# Patient Record
Sex: FEMALE | Race: ASIAN | NOT HISPANIC OR LATINO | Employment: STUDENT
[De-identification: names, ages, dates, MRNs, and addresses within clinical notes are randomized per-mention and may not be internally consistent; named-entity substitution may affect disease eponyms.]

---
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
## 2018-03-22 ENCOUNTER — OFFICE VISIT (OUTPATIENT)
Dept: URGENT CARE | Facility: URGENT CARE | Age: 18
End: 2018-03-22
Payer: COMMERCIAL

## 2018-03-22 VITALS
BODY MASS INDEX: 20.62 KG/M2 | HEIGHT: 60 IN | DIASTOLIC BLOOD PRESSURE: 51 MMHG | SYSTOLIC BLOOD PRESSURE: 106 MMHG | OXYGEN SATURATION: 100 % | WEIGHT: 105 LBS | HEART RATE: 50 BPM | TEMPERATURE: 97.9 F

## 2018-03-22 DIAGNOSIS — S05.01XA ABRASION OF RIGHT CORNEA, INITIAL ENCOUNTER: Primary | ICD-10-CM

## 2018-03-22 PROCEDURE — 99203 OFFICE O/P NEW LOW 30 MIN: CPT | Performed by: PHYSICIAN ASSISTANT

## 2018-03-22 RX ORDER — MOXIFLOXACIN 5 MG/ML
1 SOLUTION/ DROPS OPHTHALMIC
Qty: 1 BOTTLE | Refills: 0 | Status: SHIPPED | OUTPATIENT
Start: 2018-03-22 | End: 2018-03-29

## 2018-03-22 NOTE — MR AVS SNAPSHOT
"              After Visit Summary   3/22/2018    Joyce Menendez    MRN: [id number]           Patient Information     Date Of Birth          2000        Visit Information        Provider Department      3/22/2018 7:35 PM Shiva Noble PA-C [facility]        Today's Diagnoses     Abrasion of right cornea, initial encounter    -  1       Follow-ups after your visit        Who to contact     If you have questions or need follow up information about today's clinic visit or your schedule please contact [facility] directly at [phone].  Normal or non-critical lab and imaging results will be communicated to you by Alacritechhart, letter or phone within 4 business days after the clinic has received the results. If you do not hear from us within 7 days, please contact the clinic through Alacritechhart or phone. If you have a critical or abnormal lab result, we will notify you by phone as soon as possible.  Submit refill requests through [facility] or call your pharmacy and they will forward the refill request to us. Please allow 3 business days for your refill to be completed.          Additional Information About Your Visit        MyChart Information     [facility] lets you send messages to your doctor, view your test results, renew your prescriptions, schedule appointments and more. To sign up, go to [email or web address] . Click on \"Log in\" on the left side of the screen, which will take you to the Welcome page. Then click on \"Sign up Now\" on the right side of the page.     You will be asked to enter the access code listed below, as well as some personal information. Please follow the directions to create your username and password.     Your access code is: [email or web address]  Expires: 2018  8:24 PM     Your access code will  in 90 days. If you need help or a new code, please call your [facility] clinic or [phone].        Care EveryWhere ID     This is your Care " EveryWhere ID. This could be used by other organizations to access your [facility] medical records  [id number]        Your Vitals Were     Pulse Temperature Height Pulse Oximetry Breastfeeding? BMI (Body Mass Index)    50 97.9  F (36.6  C) (Tympanic) 5' (1.524 m) 100% No 20.51 kg/m2       Blood Pressure from Last 3 Encounters:   03/22/18 106/51    Weight from Last 3 Encounters:   03/22/18 105 lb (47.6 kg)              Today, you had the following     No orders found for display         Today's Medication Changes          These changes are accurate as of 3/22/18  8:24 PM.  If you have any questions, ask your nurse or doctor.               Start taking these medicines.        Dose/Directions    moxifloxacin 0.5 % ophthalmic solution   Commonly known as:  VIGAMOX   Used for:  Abrasion of right cornea, initial encounter   Started by:  Shiva Noble PA-C        Dose:  1 drop   Place 1 drop into the right eye every 4 hours (while awake) for 7 days   Quantity:  1 Bottle   Refills:  0            Where to get your medicines      These medications were sent to [id number] - [place], [place] - [address] AT [facility]  [address], [place] [address]    Hours:  24-hours Phone:  [phone]     moxifloxacin 0.5 % ophthalmic solution                Primary Care Provider Office Phone # Fax #    [facility] [phone] [address]  [facility] [address]        Equal Access to Services     JEFF SUAREZ AH: Hadii aad ku hadasho Soomaali, waaxda luqadaha, qaybta kaalmada adeegyada, phani christine. So [facility] [phone].    ATENCIÓN: Si habla español, tiene a bailey disposición servicios gratuitos de asistencia lingüística. Llame al [phone].    We comply with applicable federal civil rights laws and [place] laws. We do not discriminate on the basis of race, color, national origin, age, disability, sex, sexual  orientation, or gender identity.            Thank you!     Thank you for choosing [facility]  for your care. Our goal is always to provide you with excellent care. Hearing back from our patients is one way we can continue to improve our services. Please take a few minutes to complete the written survey that you may receive in the mail after your visit with us. Thank you!             Your Updated Medication List - Protect others around you: Learn how to safely use, store and throw away your medicines at www.disposemymeds.org.          This list is accurate as of 3/22/18  8:24 PM.  Always use your most recent med list.                   Brand Name Dispense Instructions for use Diagnosis    moxifloxacin 0.5 % ophthalmic solution    VIGAMOX    1 Bottle    Place 1 drop into the right eye every 4 hours (while awake) for 7 days    Abrasion of right cornea, initial encounter

## 2018-03-23 NOTE — NURSING NOTE
Chief Complaint   Patient presents with     Urgent Care     Red Eye     c/o red eye for 4 days       Initial /51  Pulse 50  Temp 97.9  F (36.6  C) (Tympanic)  Ht 5' (1.524 m)  Wt 105 lb (47.6 kg)  SpO2 100%  Breastfeeding? No  BMI 20.51 kg/m2 Estimated body mass index is 20.51 kg/(m^2) as calculated from the following:    Height as of this encounter: 5' (1.524 m).    Weight as of this encounter: 105 lb (47.6 kg).  Medication Reconciliation: complete   Marlene Finn MA

## 2018-03-23 NOTE — PROGRESS NOTES
SUBJECTIVE:  Chief Complaint:   Chief Complaint   Patient presents with     Urgent Care     Red Eye     c/o red eye for 4 days     History of Present Illness:  Joyce Menendez is a 18 year old female who presents complaining of moderate right eye redness for 3 day(s). Left contacts in for 2 days until today. Did get sunbed tanning x2 prior to symptoms with use of eye covering.   Onset/timing: sudden.    Associated Signs and Symptoms: none  Treatment measures tried include: none  Contact wearer : Yes     No past medical history on file.  No current outpatient prescriptions on file.        ROS:  CONSTITUTIONAL:NEGATIVE for fever, chills, change in weight  EYES: eye pain right and redness right    OBJECTIVE:  /51  Pulse 50  Temp 97.9  F (36.6  C) (Tympanic)  Ht 5' (1.524 m)  Wt 105 lb (47.6 kg)  SpO2 100%  Breastfeeding? No  BMI 20.51 kg/m2  General: no acute distress  Eye exam: left eye normal lid, conjunctiva, cornea, pupil and fundus, right eye abnormal findings: conjunctivitis with erythema, corneal abrasion noted 5 o'clock 1 mm.   .  Ears: normal canals, TMs bilaterally, normal TM mobility  Nose: NORMAL - no drainage, turbinates normal in size.  Neck: supple, non-tender, free range of motion, no adenopathy    ASSESSMENT:      1. Abrasion of right cornea, initial encounter    - moxifloxacin (VIGAMOX) 0.5 % ophthalmic solution; Place 1 drop into the right eye every 4 hours (while awake) for 7 days  Dispense: 1 Bottle; Refill: 0    PLAN:  Antibiotic ointment per order. Follow up with eye clinic if not improving over the next 48 hrs.   See orders in epic

## 2018-12-09 ENCOUNTER — OFFICE VISIT (OUTPATIENT)
Dept: URGENT CARE | Facility: URGENT CARE | Age: 18
End: 2018-12-09
Payer: COMMERCIAL

## 2018-12-09 VITALS
TEMPERATURE: 97.9 F | SYSTOLIC BLOOD PRESSURE: 103 MMHG | HEART RATE: 81 BPM | BODY MASS INDEX: 20.62 KG/M2 | WEIGHT: 105 LBS | HEIGHT: 60 IN | DIASTOLIC BLOOD PRESSURE: 64 MMHG | OXYGEN SATURATION: 99 %

## 2018-12-09 DIAGNOSIS — S09.90XA HEAD INJURY, INITIAL ENCOUNTER: Primary | ICD-10-CM

## 2018-12-09 PROCEDURE — 99213 OFFICE O/P EST LOW 20 MIN: CPT | Performed by: FAMILY MEDICINE

## 2018-12-09 ASSESSMENT — MIFFLIN-ST. JEOR: SCORE: 1167.78

## 2018-12-10 NOTE — PROGRESS NOTES
Subjective: About an hour ago patient was working out and tried to jump onto what looks like a cart, and it pushed away and she fell straight back hitting her upper back and neck and head onto the floor.  She was not knocked out but she was a little dazed and she is dizzy.  In high school she had a concussion that took almost 6 months to recover from, feels the symptoms are somewhat similar which really worries her.  She does not feel nauseous.    Objective: Neurologic exam is pretty normal except that with her eyes shut it is hard for her to touch her nose, but she can do it.  She does not have a tremor.  Fundi are normal.  TMs are normal.  Nose is normal.  DTRs are normal and symmetric.  Muscle strength is normal and symmetric.  Neck is normal.    Assessment and plan: Head injury from a fall.  I think at this point it is too early to tell what will really happen.  She should probably follow-up with the concussion clinic.  She has finals next week in school, works part-time as a , uncertain how much she will be able to do in the coming weeks.

## 2018-12-19 ENCOUNTER — TELEPHONE (OUTPATIENT)
Dept: SURGERY | Facility: CLINIC | Age: 18
End: 2018-12-19

## 2018-12-20 ENCOUNTER — RECORDS - HEALTHEAST (OUTPATIENT)
Dept: ADMINISTRATIVE | Facility: OTHER | Age: 18
End: 2018-12-20

## 2018-12-20 ENCOUNTER — TELEPHONE (OUTPATIENT)
Dept: SURGERY | Facility: CLINIC | Age: 18
End: 2018-12-20

## 2018-12-20 ENCOUNTER — OFFICE VISIT (OUTPATIENT)
Dept: SURGERY | Facility: CLINIC | Age: 18
End: 2018-12-20
Payer: COMMERCIAL

## 2018-12-20 VITALS
HEIGHT: 60 IN | WEIGHT: 105 LBS | BODY MASS INDEX: 20.62 KG/M2 | SYSTOLIC BLOOD PRESSURE: 104 MMHG | HEART RATE: 78 BPM | DIASTOLIC BLOOD PRESSURE: 63 MMHG | OXYGEN SATURATION: 99 %

## 2018-12-20 DIAGNOSIS — G44.201 ACUTE INTRACTABLE TENSION-TYPE HEADACHE: ICD-10-CM

## 2018-12-20 DIAGNOSIS — S06.0X0A CONCUSSION WITHOUT LOSS OF CONSCIOUSNESS, INITIAL ENCOUNTER: Primary | ICD-10-CM

## 2018-12-20 DIAGNOSIS — M54.2 CERVICALGIA: ICD-10-CM

## 2018-12-20 DIAGNOSIS — F43.23 ADJUSTMENT DISORDER WITH MIXED ANXIETY AND DEPRESSED MOOD: ICD-10-CM

## 2018-12-20 DIAGNOSIS — R42 DIZZINESS: ICD-10-CM

## 2018-12-20 DIAGNOSIS — H53.149 VISUAL DISCOMFORT, UNSPECIFIED LATERALITY: ICD-10-CM

## 2018-12-20 RX ORDER — ACETAMINOPHEN 325 MG/1
325-650 TABLET ORAL EVERY 4 HOURS PRN
COMMUNITY

## 2018-12-20 SDOH — HEALTH STABILITY: MENTAL HEALTH: HOW OFTEN DO YOU HAVE A DRINK CONTAINING ALCOHOL?: NEVER

## 2018-12-20 ASSESSMENT — ANXIETY QUESTIONNAIRES
GAD7 TOTAL SCORE: 14
4. TROUBLE RELAXING: MORE THAN HALF THE DAYS
6. BECOMING EASILY ANNOYED OR IRRITABLE: SEVERAL DAYS
GAD7 TOTAL SCORE: 14
7. FEELING AFRAID AS IF SOMETHING AWFUL MIGHT HAPPEN: SEVERAL DAYS
5. BEING SO RESTLESS THAT IT IS HARD TO SIT STILL: MORE THAN HALF THE DAYS
7. FEELING AFRAID AS IF SOMETHING AWFUL MIGHT HAPPEN: SEVERAL DAYS
2. NOT BEING ABLE TO STOP OR CONTROL WORRYING: NEARLY EVERY DAY
GAD7 TOTAL SCORE: 14
3. WORRYING TOO MUCH ABOUT DIFFERENT THINGS: NEARLY EVERY DAY
1. FEELING NERVOUS, ANXIOUS, OR ON EDGE: MORE THAN HALF THE DAYS

## 2018-12-20 ASSESSMENT — PATIENT HEALTH QUESTIONNAIRE - PHQ9
SUM OF ALL RESPONSES TO PHQ QUESTIONS 1-9: 14
10. IF YOU CHECKED OFF ANY PROBLEMS, HOW DIFFICULT HAVE THESE PROBLEMS MADE IT FOR YOU TO DO YOUR WORK, TAKE CARE OF THINGS AT HOME, OR GET ALONG WITH OTHER PEOPLE: VERY DIFFICULT
SUM OF ALL RESPONSES TO PHQ QUESTIONS 1-9: 14

## 2018-12-20 ASSESSMENT — PAIN SCALES - GENERAL: PAINLEVEL: MODERATE PAIN (4)

## 2018-12-20 ASSESSMENT — MIFFLIN-ST. JEOR: SCORE: 1167.78

## 2018-12-20 NOTE — LETTER
December 20, 2018    Workability Form for Joyce Menendez, 2000 who is under my care for a concussion that occurred on 12/9/2018     Date of most recent exam: December 20 th. 2018  Date of next exam: week of January 28th    Diagnosis:  *** Concussion with loss of consciousness, 30 minutes or less  S06.0X1A  *** Concussion without loss of consciousness  S06.0X0D  *** Post concussion syndrome   F07.81    --- is not permitted to work until medically cleared. ***   --- Will need to be off work and may start again on ***  --- May return to work as of *** with the following restrictions:  1. Work- {ccwork:586975}, ***on non-consecutive days.  2. Must have a 10 minute break every 2 hours.  3. Driving- {ccdrive:844638}  4. No heavy lifting more than *** pounds  5. No working with machinery  6. No heights due to risk of dizziness, balance problems    The following accommodations may help in reducing the cognitive load, thereby minimizing post-concussion symptoms.  As the employer, it is encouraged to discuss and establish accommodations based on individual work responsibilities.  If symptoms persist, more formal accommodations may be necessary.   1)  Allow more time for, or delay for projects.   2)  Allow more time for work completion.   3)  Allow for reduced work load.   4)  Allow for less multi-task work.  Single tasks until completion will improve productivity.   5)  Allow breaks as needed to control symptom levels. For example, if symptoms worsen during a specific task, project or meeting, She may need to leave that area temporarily or rest in a quiet area until symptoms improve.   6)  Provide a quiet area for lunch.   7)  Allow use of sunglasses during the day.     Full or partial days missed due to post-concussion symptoms and follow up appointments should be medically excused.    Follow up evaluation and revision of recommendations to occur on week of January 28th.    Please feel free to contact me at the  number below with any questions or concerns.    Sincerely,            Alton Turner PA-C  Concussion Clinic  [facility]  Clinic nurse line: [phone]  Fax: [phone]

## 2018-12-20 NOTE — PROGRESS NOTES
[facility] Concussion Clinic Evaluation  December 20, 2018        Assessment:   (S06.0X0A) Concussion without loss of consciousness, initial encounter  (primary encounter diagnosis)  (G44.201) Acute intractable tension-type headache  (M54.2) Cervicalgia  (H53.149) Visual discomfort, unspecified laterality  (R42) Dizziness  (F43.23) Adjustment disorder with mixed anxiety and depressed mood    Joyce Menendez is a 18 year old female who presents for evaluation of concussion without loss of consciousness which occurred on 12/9/2018.  She explains that she was doing box jumps at her gym with mats that were not velcroed well.  1 of the boxes gave way and she ended up falling and hitting her upper back, neck and head on the floor.  She notes immediately feeling dazed and dizzy.  She presented to urgent care directly from the gym for evaluation.  Exam was unremarkable and she denied nausea at the time she was referred here for further follow-up.  She subsequently presented to [facility] and was diagnosed with concussion, advised to stay off screens and get in contact with the disability resource center.    Currently, she notes an intractable pressure/tension headache that waxes and wanes in severity and does respond well to Tylenol.  Is located at the top of her head and right radiates posteriorly.  She lost her balance recently but did not fall and notes that there are a lot of little symptoms that she notices throughout the day that are frustrating.  She is dizzy when standing up too fast and notes halos around lights.  She is having particular trouble with prolonged screen use which is problematic for her as a student facing final exams.  She is taken incompletes in all 3 of her courses.  She also notes an exertional headache, particularly pounding when ascending stairs.  She also become dizzy when bending to pick something up.  She has difficulty falling and staying asleep, more so than usual.   "Racing thoughts impaired falling asleep.  CSA score is 55/90.    Joyce sustained a concussion with loss of consciousness while she was in high school.  The injury was first noted on 12/3/2016 and she was diagnosed with postconcussion syndrome on 4/8/2017.  She was seen by the [facility] pediatric neuropsychology clinic for evaluation on 10/27/2017 and diagnosed with anxiety disorder and history of concussion.  She was not other wise formally treated for her concussion.  She tends toward anxiety and has tried an SSRI during her freshman year of college but did not tolerate this well-noting that on Zoloft she \"did not feel like herself\".  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  Personally, she is facing a difficult home life.  About a month ago she moved out of her mother's house due to her alcohol abuse, living with her godmother for about 1 month.  Just today she is moving back in with her mother and is stressed about this.    On exam, so if he is anxious appearing but otherwise in no distress.  She is tender paracervical musculature bilaterally, palpation here reproduces her headache.  Convergence testing is with a 10 cm vergence point, approaching this provokes symptoms of dizziness and headache.  On Romberg testing she is able to maintain her station but has some excess sway.  She deviates from straight line during gait testing with her head tilted to the side.  She has some inefficiencies on cognitive screening but otherwise performs well.  Strength and coordination appear normal.  BHAVANA-7 and PHQ-9 scores are both 14 indicating moderate anxiety and depression symptoms.    Overall, my impression is that self he did sustain a concussion as a result of her injury on 12/9/2018 however I am concerned that she is at an increased risk for an adjustment disorder and resulting prolonged course of " symptoms given her experience with prolonged concussion symptoms in this her recent history, premorbid anxiety, the demands of full-time college work, and her difficult personal situation at home.  This was explained to her.  We spent time discussing the pathophysiology of concussion as well as the typical timeframe to resolution of  the biochemical changes thereof, which is about 3-6 weeks, occasionally up to 3 months.  I emphasized to her that this episode does not need to be a repeat of her greater than 1 year course of symptoms last time.  She should gradually return back to all of her usual activities, taking breaks as needed to help manage her symptom levels.  We discussed perhaps taking a 10-minute break for every 50 minutes of activity.  We discussed ways that she can compensate for her vestibular dysfunction, particularly those impairing her ability to look at screens including modification of her screen settings and closing her eyes during scrolling.  I also would like her to endeavor for daily light, low impact cardiovascular activity from the standpoint of increased cranial blood flow, anti-inflammatory effects, as well as benefits to mood and cognition.  I provided a letter for her to share with your professors outlining certain academic accommodations that may be helpful to her.  She would benefit from occupational therapy to help her with symptom compensation strategies as well as physical therapy to help address her dizziness, headache, and neck pain.  Finally, given her risk for prolonged course of symptoms I am referring her for a brief neuropsychology evaluation.  Will plan to follow up in 6 weeks to assess her progress and determine if further intervention is necessary.      Plan:  1. Biggest concern of pt addressed: School    2. Activity recommendations- Light, low impact aerobic activity    3. Medications:  a. None    4. Referral to:   a. Physical Therapy:   1. Indications/Goals: evaluation and  "treatment including but not limited to dizziness, HA  b. Occupational Therapy:   1. Indications/Goals: evaluation and treatment including but not limited to visual discomfort, cognitive compensation  c. Neuropsychology:   1. Provider: [place], Brief Eval  2. Indications/Goals: evaluation including further characterization of cognitive and mood deficits, assistance with care planning moving forward, brief intervention vs risks for prolonged recovery.    5. Follow up here in 6 weeks.    6. Letters written today for:  school    AVS Instructions:  ~ You WILL get better~    GENERAL ADVICE  ~ Gradually ease back into your usual activities.  ~ Rest as needed to help your symptoms go away.   - Consider pairing 50 minutes of activity with 10 minutes of rest.  ~ Allow yourself more time for activities.  ~ Write things down.  At home, at work, whenever there is something that you should remember, even it is simple.    SCREENS  ~ Change settings on your phone and computer using the \"Blue Light Filter\" (Night Shift on all Apple products)   ~ The goal is making screens more yellow and less blue.     ~ If this is not an option you can download this program: Cass Art, to adjust your screen resolution.  ~ Turn screen brightness down  ~ Increase font size    HEADACHE  ~ Take tylenol (1000 mg) three times a day as needed  ~ Take ibuprofen (600 mg) three times a day as needed (take with food)    NECK PAIN  ~ Ice or Heat are good~  ~ Massage is ok if it doesn't trigger more symptoms~  ~ Gentle stretches and range-of-motion are helpful    FATIGUE  ~ Daily exercise is strongly encouraged.  Start with a 10 min walk and increase the time as tolerated until you are walking 30 minutes per day.      ANXIETY OR MOOD SWINGS:  ~ If you are irritable or anxious, take a break in a quiet room.  ~ Try using the free Calm gary for guided breathing and mindfulness/meditation.  ~ Explore Tellybean (https://www.headspace.com) for free and easy-to-use " "meditation guidance.    Diet:  - In principle incorporate more protein, lots of veggies, some fruit, whole grains.    - Less sweets and saturated fat.   - Mediterranean Diet is an easy-to-follow example.  ~ Drink plenty of water throughout the day (8-10 glasses per day)      HPI  Date of injury: 12/9/18  Mechanism: Fall with posterior headstrike.    Doing box jumps at the gym, mats were not velcroed well.  One of the boxes gave way- ended up falling and hitting her upper back, neck, head on the floor.  No LOC, immediately feeling \"dazed\", dizzy.  Went to  for eval.  Noted 6 month recovery from a concussion while in high school, similar Sx.  No nausea at that time.  Normal exam.  Ref here for f/u.    12/3/16 Closed head injury noted (gymnastics), 4/8/16 PCS noted, ref to TBI clinic.  [facility] Peds Neuropsychology eval on 10/27/17 with Lilly Nagel PhD. Dx anxiety disorder.      Went to [facility] for accommodations, Dx concussion, told no screens.  HA's intractable, waxing and waning in severity. Top of head, radiates posteriorly.  Lost balance recently, but did not fall.  Lots of little things throughout the day.  Dizzy when standing up too fast.  Halos around lights.  Sx slightly different, notes frontal head-strike last time.  Trying Tylenol, once every-other day 500-1000mg.  Helps.    Current Symptoms:  CONCUSSION SYMPTOMS ASSESSMENT 12/20/2018   Headache or Pressure In Head 4 - moderate to severe   Upset Stomach or Throwing Up 5 - severe   Problems with Balance 4 - moderate to severe   Feeling Dizzy 5 - severe   Sensitivity to Light 5 - severe   Sensitivity to Noise 3 - moderate   Mood Changes 2 - mild to moderate   Feeling sluggish, hazy, or foggy 5 - severe   Trouble Concentrating, Lack of Focus 6 - excruciating   Motion Sickness 3 - moderate   Vision Changes 3 - moderate   Memory Problems 4 - moderate to severe   Feeling Confused 0 - none   Neck Pain 2 - mild to moderate   Trouble " "Sleeping 4 - moderate to severe   Total Number of Symptoms 14   Symptom Severity Score 55       Exertion:  Symptoms worsen with:    Physical Activity?: Yes- exertional HA, pounds going up stairs.  Bending to pick something up- dizzy.    Cognitive Activity?: Texting, simple conversations normal.  An hour on screens w/ eye strain/pressure.    Current sleep patterns:  Not sleeping well.  Trouble falling and staying asleep.  Up usually around 3 AM- previously 5AM.  Usually to bed around 10-11, now more like 12 AM.  Racing thoughts.    REVIEW OF SYSTEMS:  Refer to DocFlowsheets:  Concussion symptoms  GASTROINTESTINAL: +nausea, no vomiting  MUSCULOSKELETAL: + neck pn  NEUROLOGIC: +HA, dizziness. No   PSYCHIATRIC: see PHQ-9 and GAD7    In response to the scores of the GAD7 and PHQ9  we have acknowledged and addressed both the anxiety and depression issues the patient is experiencing (see assessment and plan)  Of note, the last question on the PHQ9 done today is negative.    PERTINENT PAST MEDICAL HISTORY    Risk Factors for Protracted Recovery:    Prior concussion history:  Yes    Previous number:  1  Fell off balance beam, lost consciousness.    Longest symptom duration: lasted over a year      Headache History:     Prior frequency of headache: maybe 1 per month.    History of migraine:    Personal?: yes, every 2-3 months, possibly dehydration or caffeine related.    Family?: adopted.    Prior treatment for HA, migraines or concussions: no formal Tx      Mental health and developmental history:    Anxiety- tried serotonin specific reuptake inhibitor Freshman year and did not tolerated.    Zoloft, \"didn't feel like myself\"    No past medical history on file.  There is no problem list on file for this patient.      Pertinent social history:  Currently using alcohol: no  Currently using nicotine: no  Currently using caffeine: Coconut oil coffee, 1-2 ~16 oz per day.    Currently Living at and with: moved out of mom's house " due to her alcohol abuse.  Just moved back in with her from godmother's house. +stressful.    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  Current Outpatient Medications   Medication     etonogestrel (IMPLANON/NEXPLANON) 68 MG IMPL     No current facility-administered medications for this visit.        OBJECTIVE:   /63   Pulse 78   Ht 1.524 m (5')   Wt 47.6 kg (105 lb)   SpO2 99%   BMI 20.51 kg/m      Wt Readings from Last 4 Encounters:   18 47.6 kg (105 lb)   18 47.6 kg (105 lb)       EXAM:  GENERAL: alert, oriented to person, place, time  HEAD: NC/AT  NECK:  Supple, FROM  Tender bilat paracervical mm, Palp reprod HA  PSYCHIATRIC:  Anxious mood, congruent affect. No SI, Normal speech and thought process.  Neuro:  Strength:   Shoulder shrug (C5):5/5   Bicep (C6):5/5   Tricep (C7):5/5  Visual:  ABHILASH: yes  Light sensitive: yes  EOMI: yes  Nystagmus: no  Convergence testing: Abnormal (10 cm) + dizzy and HA  Coordination:   Finger to Nose: normal   Rapid Alternating Movements: normal  Balance Testing:   Romberg: Maintains station, excess sway  Gait:   Walk in hallway at normal speed: Able    Walk in hallway and turn head side to side when asked: Deviation from straight line  Cognitive:  Immediate object recall: /  Recall 4 objects at 5 minutes: 4/4  Reverse months of the year: 10/12 (apr and march)  Spell world backwards: yes  Backwards number [id number]      Time spent in one-on-one evaluation and discussion with patient regarding nature of problem, course, prior treatments, and therapeutic options; >50% of this 50 minute visit  was spent in counseling, including this patient's personal  symptom triggers and education thereof.    Answers for HPI/ROS submitted by the patient on 12/20/2018   BHAVANA 7 TOTAL SCORE: 14  If you checked off any problems, how difficult have these problems made it for you to do your work, take care of things at home, or get along with other people?: Very difficult  PHQ9 TOTAL SCORE: 14

## 2018-12-20 NOTE — LETTER
12/20/2018       RE: Joyce Menendez  [address]  [place] [address]     Dear Colleague,    Thank you for referring your patient, Joyce Menendez, to the [facility] CONCUSSION at [facility]. Please see a copy of my visit note below.    [facility] Evaluation  December 20, 2018        Assessment:   (S06.0X0A) Concussion without loss of consciousness, initial encounter  (primary encounter diagnosis)  (G44.201) Acute intractable tension-type headache  (M54.2) Cervicalgia  (H53.149) Visual discomfort, unspecified laterality  (R42) Dizziness  (F43.23) Adjustment disorder with mixed anxiety and depressed mood    Joyce Menendez is a 18 year old female who presents for evaluation of concussion without loss of consciousness which occurred on 12/9/2018.  She explains that she was doing box jumps at her gym with mats that were not velcroed well.  1 of the boxes gave way and she ended up falling and hitting her upper back, neck and head on the floor.  She notes immediately feeling dazed and dizzy.  She presented to urgent care directly from the gym for evaluation.  Exam was unremarkable and she denied nausea at the time she was referred here for further follow-up.  She subsequently presented to [facility] and was diagnosed with concussion, advised to stay off screens and get in contact with the disability resource center.    Currently, she notes an intractable pressure/tension headache that waxes and wanes in severity and does respond well to Tylenol.  Is located at the top of her head and right radiates posteriorly.  She lost her balance recently but did not fall and notes that there are a lot of little symptoms that she notices throughout the day that are frustrating.  She is dizzy when standing up too fast and notes halos around lights.  She is having particular trouble with prolonged screen use which is problematic for her as a  "student facing final exams.  She is taken incompletes in all 3 of her courses.  She also notes an exertional headache, particularly pounding when ascending stairs.  She also become dizzy when bending to pick something up.  She has difficulty falling and staying asleep, more so than usual.  Racing thoughts impaired falling asleep.  CSA score is 55/90.    Joyce sustained a concussion with loss of consciousness while she was in high school.  The injury was first noted on 12/3/2016 and she was diagnosed with postconcussion syndrome on 4/8/2017.  She was seen by the [facility] pediatric neuropsychology clinic for evaluation on 10/27/2017 and diagnosed with anxiety disorder and history of concussion.  She was not other wise formally treated for her concussion.  She tends toward anxiety and has tried an SSRI during her freshman year of college but did not tolerate this well-noting that on Zoloft she \"did not feel like herself\".  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  Personally, she is facing a difficult home life.  About a month ago she moved out of her mother's house due to her alcohol abuse, living with her godmother for about 1 month.  Just today she is moving back in with her mother and is stressed about this.    On exam, so if he is anxious appearing but otherwise in no distress.  She is tender paracervical musculature bilaterally, palpation here reproduces her headache.  Convergence testing is with a 10 cm vergence point, approaching this provokes symptoms of dizziness and headache.  On Romberg testing she is able to maintain her station but has some excess sway.  She deviates from straight line during gait testing with her head tilted to the side.  She has some inefficiencies on cognitive screening but otherwise performs well.  Strength and coordination appear normal.  BHAVANA-7 and PHQ-9 scores " are both 14 indicating moderate anxiety and depression symptoms.    Overall, my impression is that self he did sustain a concussion as a result of her injury on 12/9/2018 however I am concerned that she is at an increased risk for an adjustment disorder and resulting prolonged course of symptoms given her experience with prolonged concussion symptoms in this her recent history, premorbid anxiety, the demands of full-time college work, and her difficult personal situation at home.  This was explained to her.  We spent time discussing the pathophysiology of concussion as well as the typical timeframe to resolution of  the biochemical changes thereof, which is about 3-6 weeks, occasionally up to 3 months.  I emphasized to her that this episode does not need to be a repeat of her greater than 1 year course of symptoms last time.  She should gradually return back to all of her usual activities, taking breaks as needed to help manage her symptom levels.  We discussed perhaps taking a 10-minute break for every 50 minutes of activity.  We discussed ways that she can compensate for her vestibular dysfunction, particularly those impairing her ability to look at screens including modification of her screen settings and closing her eyes during scrolling.  I also would like her to endeavor for daily light, low impact cardiovascular activity from the standpoint of increased cranial blood flow, anti-inflammatory effects, as well as benefits to mood and cognition.  I provided a letter for her to share with your professors outlining certain academic accommodations that may be helpful to her.  She would benefit from occupational therapy to help her with symptom compensation strategies as well as physical therapy to help address her dizziness, headache, and neck pain.  Finally, given her risk for prolonged course of symptoms I am referring her for a brief neuropsychology evaluation.  Will plan to follow up in 6 weeks to assess her  "progress and determine if further intervention is necessary.      Plan:  1. Biggest concern of pt addressed: School    2. Activity recommendations- Light, low impact aerobic activity    3. Medications:  a. None    4. Referral to:   a. Physical Therapy:   1. Indications/Goals: evaluation and treatment including but not limited to dizziness, HA  b. Occupational Therapy:   1. Indications/Goals: evaluation and treatment including but not limited to visual discomfort, cognitive compensation  c. Neuropsychology:   1. Provider: [facility], Brief Eval  2. Indications/Goals: evaluation including further characterization of cognitive and mood deficits, assistance with care planning moving forward, brief intervention vs risks for prolonged recovery.    5. Follow up here in 6 weeks.    6. Letters written today for:  school    AVS Instructions:  ~ You WILL get better~    GENERAL ADVICE  ~ Gradually ease back into your usual activities.  ~ Rest as needed to help your symptoms go away.   - Consider pairing 50 minutes of activity with 10 minutes of rest.  ~ Allow yourself more time for activities.  ~ Write things down.  At home, at work, whenever there is something that you should remember, even it is simple.    SCREENS  ~ Change settings on your phone and computer using the \"Blue Light Filter\" (Night Shift on all Apple products)   ~ The goal is making screens more yellow and less blue.     ~ If this is not an option you can download this program: [employer], to adjust your screen resolution.  ~ Turn screen brightness down  ~ Increase font size    HEADACHE  ~ Take tylenol (1000 mg) three times a day as needed  ~ Take ibuprofen (600 mg) three times a day as needed (take with food)    NECK PAIN  ~ Ice or Heat are good~  ~ Massage is ok if it doesn't trigger more symptoms~  ~ Gentle stretches and range-of-motion are helpful    FATIGUE  ~ Daily exercise is strongly encouraged.  Start with a 10 min walk and increase the time as tolerated " "until you are walking 30 minutes per day.      ANXIETY OR MOOD SWINGS:  ~ If you are irritable or anxious, take a break in a quiet room.  ~ Try using the free Calm gary for guided breathing and mindfulness/meditation.  ~ Explore Greenpie (https://www.headspace.com) for free and easy-to-use meditation guidance.    Diet:  - In principle incorporate more protein, lots of veggies, some fruit, whole grains.    - Less sweets and saturated fat.   - Mediterranean Diet is an easy-to-follow example.  ~ Drink plenty of water throughout the day (8-10 glasses per day)      HPI  Date of injury: 12/9/18  Mechanism: Fall with posterior headstrike.    Doing box jumps at the gym, mats were not velcroed well.  One of the boxes gave way- ended up falling and hitting her upper back, neck, head on the floor.  No LOC, immediately feeling \"dazed\", dizzy.  Went to  for eval.  Noted 6 month recovery from a concussion while in high school, similar Sx.  No nausea at that time.  Normal exam.  Ref here for f/u.    12/3/16 Closed head injury noted (gymnastics), 4/8/16 PCS noted, ref to TBI clinic.  [facility] Peds Neuropsychology eval on 10/27/17 with Lilly Nagel PhD. Dx anxiety disorder.      Went to [facility] for accommodations, Dx concussion, told no screens.  HA's intractable, waxing and waning in severity. Top of head, radiates posteriorly.  Lost balance recently, but did not fall.  Lots of little things throughout the day.  Dizzy when standing up too fast.  Halos around lights.  Sx slightly different, notes frontal head-strike last time.  Trying Tylenol, once every-other day 500-1000mg.  Helps.    Current Symptoms:  CONCUSSION SYMPTOMS ASSESSMENT 12/20/2018   Headache or Pressure In Head 4 - moderate to severe   Upset Stomach or Throwing Up 5 - severe   Problems with Balance 4 - moderate to severe   Feeling Dizzy 5 - severe   Sensitivity to Light 5 - severe   Sensitivity to Noise 3 - moderate   Mood Changes 2 - mild " "to moderate   Feeling sluggish, hazy, or foggy 5 - severe   Trouble Concentrating, Lack of Focus 6 - excruciating   Motion Sickness 3 - moderate   Vision Changes 3 - moderate   Memory Problems 4 - moderate to severe   Feeling Confused 0 - none   Neck Pain 2 - mild to moderate   Trouble Sleeping 4 - moderate to severe   Total Number of Symptoms 14   Symptom Severity Score 55       Exertion:  Symptoms worsen with:    Physical Activity?: Yes- exertional HA, pounds going up stairs.  Bending to pick something up- dizzy.    Cognitive Activity?: Texting, simple conversations normal.  An hour on screens w/ eye strain/pressure.    Current sleep patterns:  Not sleeping well.  Trouble falling and staying asleep.  Up usually around 3 AM- previously 5AM.  Usually to bed around 10-11, now more like 12 AM.  Racing thoughts.    REVIEW OF SYSTEMS:  Refer to DocFlowsheets:  Concussion symptoms  GASTROINTESTINAL: +nausea, no vomiting  MUSCULOSKELETAL: + neck pn  NEUROLOGIC: +HA, dizziness. No   PSYCHIATRIC: see PHQ-9 and GAD7    In response to the scores of the GAD7 and PHQ9  we have acknowledged and addressed both the anxiety and depression issues the patient is experiencing (see assessment and plan)  Of note, the last question on the PHQ9 done today is negative.    PERTINENT PAST MEDICAL HISTORY    Risk Factors for Protracted Recovery:    Prior concussion history:  Yes    Previous number:  1  Fell off balance beam, lost consciousness.    Longest symptom duration: lasted over a year      Headache History:     Prior frequency of headache: maybe 1 per month.    History of migraine:    Personal?: yes, every 2-3 months, possibly dehydration or caffeine related.    Family?: adopted.    Prior treatment for HA, migraines or concussions: no formal Tx      Mental health and developmental history:    Anxiety- tried serotonin specific reuptake inhibitor Freshman year and did not tolerated.    Zoloft, \"didn't feel like myself\"    No past medical " history on file.  There is no problem list on file for this patient.      Pertinent social history:  Currently using alcohol: no  Currently using nicotine: no  Currently using caffeine: Coconut oil coffee, 1-2 ~16 oz per day.    Currently Living at and with: moved out of mom's house due to her alcohol abuse.  Just moved back in with her from godmother's house. +stressful.    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  Current Outpatient Medications   Medication     etonogestrel (IMPLANON/NEXPLANON) 68 MG IMPL     No current facility-administered medications for this visit.        OBJECTIVE:   /63   Pulse 78   Ht 1.524 m (5')   Wt 47.6 kg (105 lb)   SpO2 99%   BMI 20.51 kg/m       Wt Readings from Last 4 Encounters:   12/09/18 47.6 kg (105 lb)   03/22/18 47.6 kg (105 lb)       EXAM:  GENERAL: alert, oriented to person, place, time  HEAD: NC/AT  NECK:  Supple, FROM  Tender bilat paracervical mm, Palp reprod HA  PSYCHIATRIC:  Anxious mood, congruent affect. No SI, Normal speech and thought process.  Neuro:  Strength:   Shoulder shrug (C5):5/5   Bicep (C6):5/5   Tricep (C7):5/5  Visual:  ABHILASH: yes  Light sensitive: yes  EOMI: yes  Nystagmus: no  Convergence testing: Abnormal (10 cm) + dizzy and HA  Coordination:   Finger to Nose: normal   Rapid Alternating Movements: normal  Balance Testing:   Romberg: Maintains station, excess sway  Gait:   Walk in hallway at normal speed: Able    Walk in hallway and turn head side to side when asked: Deviation from straight line  Cognitive:  Immediate object recall: 4/4  Recall 4 objects at 5 minutes: 4/4  Reverse months of the year: 10/12 (apr and march)  Spell world backwards:  yes  Backwards number [id number]      Time spent in one-on-one evaluation and discussion with patient regarding nature of problem, course, prior treatments, and therapeutic options; >50% of this 50 minute visit  was spent in counseling, including this patient's personal symptom triggers and education thereof.    Again, thank you for allowing me to participate in the care of your patient.      Sincerely,    Alton Turner PA-C

## 2018-12-20 NOTE — NURSING NOTE
Chief Complaint   Patient presents with     Head Injury     concussion w/o loc- 12/9/2018 - fall w/posterior head strike       Vitals:    12/20/18 1215   BP: 104/63   Pulse: 78   SpO2: 99%   Weight: 47.6 kg (105 lb)   Height: 1.524 m (5')       Body mass index is 20.51 kg/m .  BHAVANA-7 SCORE 12/20/2018   Total Score 14 (moderate anxiety)   Total Score 14     PHQ-9 SCORE 12/20/2018   PHQ-9 Total Score MyChart 14 (Moderate depression)   PHQ-9 Total Score 14     CONCUSSION SYMPTOMS ASSESSMENT 12/20/2018   Headache or Pressure In Head 4 - moderate to severe   Upset Stomach or Throwing Up 5 - severe   Problems with Balance 4 - moderate to severe   Feeling Dizzy 5 - severe   Sensitivity to Light 5 - severe   Sensitivity to Noise 3 - moderate   Mood Changes 2 - mild to moderate   Feeling sluggish, hazy, or foggy 5 - severe   Trouble Concentrating, Lack of Focus 6 - excruciating   Motion Sickness 3 - moderate   Vision Changes 3 - moderate   Memory Problems 4 - moderate to severe   Feeling Confused 0 - none   Neck Pain 2 - mild to moderate   Trouble Sleeping 4 - moderate to severe   Total Number of Symptoms 14   Symptom Severity Score 55

## 2018-12-20 NOTE — LETTER
12/20/18    To Whom It May Concern:    Joyce Menendez, 2000, is under my care for a concussion that occurred on 12/9/2018.    Understanding the need to balance student access with academic integrity, we propose the following accommodations.  They may help in reducing cognitive load, thereby minimizing post-concussion symptoms.    Homework and coursework changes  - Preferred seating in classroom, at front of room or place with least distractions (avoid windows)  - Give extra time to finish assignments, allow them to be turned in late  - Give a copy of assignments, outline, or notes ahead of time  - Let student record lecture, or arrange for notes from a classmate to be photocopied  - Allow breaks as needed to control symptom levels.  For example, if symptoms worsen during a specific task, project, or class, She may need to leave that area temporarily or rest until symptoms improve.    Testing changes  - Test in a quiet area away from distractions  - Give extra time to complete tests  - Read the test to the student    Full or partial days missed due to post-concussion symptoms and follow up appointments should be medically excused.  Follow up evaluation and revision of recommendations to occur the week of 1/28/2018    Please feel free to contact me at the number below with any questions or concerns.    Sincerely,      Alton Turner PA-C  Concussion Clinic  [facility]  Clinic nurse line: [phone]  Fax: [phone]

## 2018-12-21 ENCOUNTER — AMBULATORY - HEALTHEAST (OUTPATIENT)
Dept: NEUROLOGY | Facility: CLINIC | Age: 18
End: 2018-12-21

## 2018-12-21 DIAGNOSIS — S06.0XAA CONCUSSION: ICD-10-CM

## 2018-12-21 ASSESSMENT — ANXIETY QUESTIONNAIRES: GAD7 TOTAL SCORE: 14

## 2019-03-02 NOTE — Clinical Note
"10/27/2017      RE: Joyce Menendez  [address] [facility]  [place] [address]         SUMMARY OF NEUROPSYCHOLOGICAL EVALUATION  PEDIATRIC NEUROPSYCHOLOGY CLINIC  DIVISION OF CLINICAL BEHAVIORAL NEUROSCIENCE     Name: Joyce Menendez   YOB: 2000   MRN:  [id number]   Date of Visit:   October 27, 2017     Reason for Evaluation:   Joyce Menendez is a 17-year, 8-month-old, right-handed, -American female who was referred for neuropsychological evaluation due to concerns of anxiety, attention and reading problems interfering with her ability to perform well in college. Joyce was adopted from [facility] when she was 1 years old. Her medical history is significant for a concussion sustained in October 2015.     Relevant History: Background information was gathered via interviews with Joyce and her adoptive mother (henceforth referred to as mother), Gabbie Menendez, a developmental history questionnaire, and a review of available records.     Developmental and Medical History:   Little is known about Joyce's early history as she adopted from [place] when she was [age] years old. At 18 months of age, Joyce underwent a surgical heart repair for ventricular septal defect (VSD). Joyce s vision has been corrected with glasses since age 3. She currently wears contacts lenses.     Joyce has generally been healthy aside from a concussion in October 2015. Joyce fell while doing a back handspring on the balance beam. She hit the left side of her face. It is unclear whether or not a brief loss of consciousness was sustained. Joyce reported that she was \"out of it\" for a couple of seconds, but continued with gymnastics practice. Joyce s memory of the event is good. Following the concussion, she experienced a number of physical symptoms, including difficulty reading for an extended time, fatigue, dizziness, headaches, and vision problems. She continued attending school and gymnastics practice. " Approximately three weeks later, her  referred her for further evaluation and it was determined that she had sustained a mild concussion.  She reportedly stopped using technology and was out of gymnastics practice for two weeks following. Joyce reported continuing to have symptoms through her freshman year of college. This year (sophomore year), Joyce has been able to read for longer periods of time and no longer experiences visual symptoms or dizziness. Joyce continues to experience frequent headaches, which she attributes to being dehydrated or not having enough caffeine. Currently, Joyce experiences headaches about every other day, although they are not severe, only mildly irritating. They are responsive to ibuprofen. She does not experience migraines. Joyce has been on Nexplanon (contraceptive implant) for birth control since March 2016.    Joyce typically eats 2 to 3 meals per day. She gets about 6 to 7 hours of sleep per night on average. She reported having difficulties falling asleep, noting that it typically takes almost 30 minutes to fall asleep. She often feels overtired. She drinks the equivalent of 3 to 4 cups of coffee each morning. Joyce reported that she does not tolerate alcohol well and currently consumes alcoholic beverages about once every three weeks. When going out with friends on the weekends, Joyce often smokes cannabis. She reported that she sometimes smokes to relax.     Family and Social History:   Joyce lives in [place], [place] with her mother and older sister (age 21) who was also adopted from [place]. Prior to being adopted Joyce lived in an orphanage. She has no contact with biological parents. She reports having a close relationship with her sister, although they are  very different  and often get frustrated with one another.    Socially, Joyce does well with peers. She identified having many close friendships and she enjoys spending time with friends. She  currently has a boyfriend of one year who is also a college sophomore. She is employed as a  at a local restaurant. Joyce engages in a daily exercise routine including lifting weights and running.    School History:   Joyce is a sophomore in college at [employer] in [place]. She reported that she is currently eligible for accommodations for anxiety, enabling her to take tests in an alternate setting. During her freshman year, she earned a combination of C s and D s. She reported that these grades were significantly lower than her grades in high school. She noted having difficulty getting to classes due to worry about others being ahead of her and also concern that she would not learn well in that format. She had difficulty staying focused in large lectures.  She noted that she would often procrastinate on work. Joyce reported doing much better academically this semester. She reported that she recently switched her major to Communication. She is more interested in her classes and stated that she is currently a week ahead with her work.    Joyce has a history of difficulties with reading and received specialized instruction through an individualized education plan (IEP) from second grade through fifth grade. She caught up to grade level in elementary school and received no further support in school. Joyce's mother reports that she continues to be a slow reader. Her writing contains good content, but has poor grammar and punctuation. Both Joyce and her mother report that Joyce learns best when she both sees and hears information. She also benefits from repetition. Joyce described the transition to high school as hard due to learning and social difficulties. She reported that she was bad at reading and had poor self-confidence.     Emotional and Behavioral Functioning:   Joyce has a long history of emotional dysregulation. As a toddler, Joyce was impulsive and easily overstimulated. She  "had difficulty calming herself down when upset. In [employer], Joyce experienced a depressed mood and expressed suicidal ideation. No suicide attempts were made. She also reported feeling depressed last year, which she partially attributed to interactions with her roommate who was also feeling depressed. Joyce tried Prozac, but discontinued as she did not like how it made her feel. She reported that she does not feel she needs to be on medication. Her mood reportedly improved during the second semester of her freshman year, which she stated may have been partially due to her roommate leaving school. Joyce denied current suicidal thoughts or self-injury.     Joyce has been in therapy with Dr. Claude Riedel since childhood. She currently sees him biweekly during summer breaks. Joyce described herself as anxious. She is often worried about her school performance and what other people are thinking during social interactions. Joyce will get stuck thinking negatively and will have a hard time shifting away from those thoughts. She has a tendency to overthink things, which slows her down. Last year, she described feeling overwhelmed in class and would worry that other students were further ahead than she was, which led to her skipping class. Joyce also experiences significant anxiety surrounding her performance on tests and quizzes, especially during timed tasks. She will get \"stuck in her thoughts\" and run out of time.    Behavioral Observations    Joyce presented for evaluation accompanied by her mother. She appeared her stated age. She was well-groomed and casually dressed in age-appropriate attire. She easily transitioned to the testing room to begin working. Joyce was friendly and pleasant upon meeting the examiner. Rapport was easily established and maintained throughout the evaluation. Joyce was talkative and openly shared with the examiner about her past experiences and the content of her worries. " She easily engaged in reciprocal, spontaneous conversation between tasks. Joyce s affect was stable and generally neutral or positive throughout testing. She made good eye contact. Her speech was fluent, goal-directed, and her prosody was typical. Joyce was compliant and cooperative. No gross or fine motor abnormalities were observed. Joyce endorsed having a headache at the start of the evaluation, including expressing complaints that she was hungry and needed coffee. Testing was completed in one session and Joyce was not on any medication. She denied using cannabis in the few days prior to the evaluation.     During testing, Joyce displayed performance anxiety. She demonstrated concern for her performance and often second-guessed herself, which resulted in her taking longer than expected to complete tasks. She engaged in self-talk throughout most tasks, even those on which it was not likely to be a helpful strategy. Her self-talk often snowballed, leading to more negative content (e.g.,  why am I so bad at this? ). Joyce was hyper-attuned to her performance and frequently looked to the examiner for reassurance. Time pressure often appeared to make Joyce baron, leading her to make careless errors. She often noticed and self-corrected her errors. Her activity level was within normal limits. She demonstrated some impulsivity, such as starting the task before instructions were finished. Embedded effort measures were suggestive of good effort. Overall, Joyce worked hard and demonstrated good motivation, indicating that the following results can be considered a valid representation of her current neurocognitive and behavioral functioning.     Neuropsychological Evaluation Methods and Instruments    Review of Records  Clinical Interview  Wechsler Adult Intelligence Scale, Fourth Edition (WAIS-IV)  Sidney Victor Manuel Reading Test (Form G)  Test of Variables of Attention (YUKI) - Visual  Avelina-Lezama Executive Function  System   Trail Making Test   Color-Word Interference   Verbal Fluency  California Verbal Learning Test, Second Edition (CVLT-II)  Dre Complex Figure Test and Recognition Trial  P[employer]ue Pegboard  Beery-Buktenica Test of Visual Motor Integration (Beery VMI), Sixth Edition  Behavior Rating Inventory of Executive Functioning - Adult Version (BRIEF-A)  Behavioral Assessment System for Children, Third Edition (BASC-3), Parent and Teacher Report    A full summary of test scores is provided in tables at the end of this report.    Results and Impressions    Joyce is 17-year-old female who presented for neuropsychological evaluation due to concerns of anxiety and attention interfering with her ability to learn. These symptoms are longstanding, but were particularly noticeable and frustrating during her freshman year in college. Joyce s history is significant for a concussion sustained in 2015. Joyce is not currently taking any psychoactive medications.    Results from the current evaluation indicate that Joyce's cognitive abilities fall solidly in the average range, with equally developed verbal and nonverbal problem-solving skills. Joyce's processing speed was also in the average range. Joyce demonstrated a significant weakness in working memory (holding information in mind and manipulating it), which was below average. Of note, Joyce was aware of her weakness in this area and negative self-talk often got in the way of her completing the task quickly and efficiently. On one task, she was required to complete simple arithmetic problems in her head. Joyce needed frequent repetition of items and often arrived at the correct answer after the time limit had . These observations illustrate how Joyce's anxiety impacts her cognitive functioning.    Given Joyce's early reading difficulties, her reading skills were assessed. Joyce scored in the average range in terms of her vocabulary level. Notably, children  with early reading difficulties often continue to display slower reading fluency and poorer comprehension compared to peers even after their phonemic skills have been remediated. Joyce's reading rate was comparable to same-age peers.  In contrast, Joyce's reading comprehension of college-level material fell slightly below average. Reading comprehension is reliant on several different underlying skills, including attention, working memory, and ability to draw inferences. Joyce s difficulties in working memory documented above may have contributed to her lower score in this area. Of note, Joyce and her mother both noted that she understands material better when she reads it aloud and is able to view the text. This strategy may support her ability to pay attention and remember what she has read given the multi-modal presentation. Thus, it will be important for Joyce to learn additional skills to assist with reading comprehension while reading textbooks for class, as this type of reading is a large component of college-level work.    Joyce s attention was evaluated directly using a computerized measure. Joyce demonstrated good ability to sustain her attention over the course of the 20-minute task. Her response speed ranged from average to faster than average and was consistent across the task. This result indicated age-appropriate speed of information processing. Ariadnas made an increased number of impulsive errors during the first five minutes of the task, which suggests difficulty adjusting to test conditions or anxiety. High impulsive errors combined with a faster than normal response speed suggests that Joyce emphasized responding as quickly as possible at the expense of accuracy. Once adjusted to the task, Joyce's performance improved. On an attention-specific questionnaire, Joyce's mother endorsed 3 of 9 symptoms of inattention and no symptoms of hyperactivity/impulsivity, where six or more items is  considered clinically significant. Joyce's mother indicated that Joyce has difficulty organizing tasks and activities, is easily distracted, and is forgetful in daily activities.  Joyce's mother indicated that Joyce had increased hyperactivity levels as a child as well. Minimal levels of inattention and hyperactivity were endorsed on a broad-based measure of emotional and behavioral functioning. Behaviorally, during testing, Joyce displayed mild attentional difficulties resulting in the need for repetition of items. Similar to her performance on the computerized task, Joyce had a tendency to prioritize speed over accuracy on timed tasks, resulting in careless errors. Taken together, these findings do not suggest core deficit in attention. Instead, they suggest mild weaknesses in attention/impulse control that appear driven by anxiety.    Closely related to attention are executive functions, skills needed to regulate one's thoughts, emotions, and behaviors. These skills include planning, concept formation, mental flexibility, and the ability to use feedback to modify behavior; these capacities are important in complex problem-solving, self-monitoring, and the development of abstract thinking skills. Joyce s performance was variable on direct measures of executive functioning. She scored in the average range when generating words that began with a specific letter and ones that belong to a specific category. However, when the task demands increased and Joyce was asked to generate words from alternating categories, her performance was significantly below age expectations. Joyce scored in the average range on tasks of visual scanning and sequencing. Of note, Joyce made an impulsive error when sequencing numbers and became flustered, dissolving into laughter. Joyce also became flustered when she could not locate a letter on the letter sequencing task, resulting in a significantly slower time and score. Her  ability to adjust to task demands and alternate between sequencing numbers and letters was average. With respect to organization, Joyce used an organized approach to drawing a complex figure with many details, drawing first the outline and then adding in the details as she went. On a verbal learning task, Joyce similarly organized words by category to aid in memorization. Joyce s mother rated Joyce s use of these skills during everyday tasks as within the average range, although slight elevations were seen on scales assessing task monitoring and organization of materials. No elevations were seen on a parallel self-report measure. Taken together, Joyce demonstrated mild difficulties with self-monitoring and mental flexibility, but these were not consistently observed across measures.     As individuals who have sustained a concussion often demonstrate difficulties with memory, Joyce s verbal and visual learning and memory were assessed. On a verbal list learning task, Joyce scored in the average range when recalling words immediately and after a brief delay. Joyce demonstrated poor discriminability when asked to identify learned words from a larger list of words. Poor discriminability in the face of good recall indicates that Joyce may have become overwhelmed when presented with additional options, which is a consistent pattern for those with a history of anxiety. Of note, Joyce could only remember 4 of 15 words on the first trial, which is indicative of working memory difficulties, but can also be attributed to performance anxiety as it took her longer to adjust to task demands. Joyce benefitted from repetition and was able to remember 12 of 15 words by the last trial. Joyce s visual memory was also average upon immediate recall and after a brief delay. Of note, Joyce narrated while she was drawing, counting lines and commenting on details, which appeared to aid in her ability to recall the details.  This pattern further confirms Joyce s and her mother s report of improved learning when information is presented in multiple modalities.    Joyce s fine motor skills were also assessed. On a task of fine motor speed and dexterity, she performed slightly below average when using her dominant (right) hand, her non-dominant hand, and when using both hands simultaneously. Her visual-motor integration was assessed using a task requiring Joyce to copy increasingly complex geometric figures. She scored slightly below average. Notably, Joyce took her time on this task and worked hard to make each design  perfect.  She exhibited a significant amount of negative self-talk when she had difficulty and frequently commented to the examiner,  Don t  me.  This was the first task completed so it is likely that Joyce s anxiety contributed to her performance on this task. Joyce scored in the average range when copying a complex figure with many details for both speed and accuracy, indicating age-appropriate visual-motor integration. These findings indicate that Joyce may be slightly slower than same-aged peers when completing tasks that require fine motor skills, such as taking notes in class or completing a writing-intensive exam.     With respect to emotional functioning, Joyce has a long history of high reactivity and difficulties calming herself down as she lacks the necessary coping skills. She has experienced periods of depressed mood both in middle school and recently in college. Joyce s concussion seemed to disrupt her senior year of high school and led to a period of time when she struggled to cope with the fatigue and other physical symptoms. She resorted to maladaptive coping strategies and began missing classes and smoking cannabis on a regular basis, which likely did not help her healing brain following concussion. Currently, Joyce smokes cannabis on the weekends with friends and to help relax. While Joyce  has reduced her use, it will be important for her to develop more adaptive coping strategies to manage her distress as she is at risk for more problematic substance use. Additionally, Joyce has longstanding worries about her academic performance and in her daily interactions with others. While she has had support through her therapist, this has been inconsistent while she is away at college. During testing, Joyce displayed anxiety surrounding how the examiner viewed her and her performance. She exhibited frequent negative self-talk, causing tasks to take longer and getting in her way when solving problems. Broad-based measures of emotional and behavioral functioning completed by Joyce and her mother were unremarkable. Joyce s ratings indicated elevated concerns about test anxiety. These findings indicate that Joyce experiences significant anxiety that impacts her daily and academic functioning, warranting a diagnosis of Other Specified Anxiety Disorder.     In summary, Joyce is a bright and resilient young woman who has a history of depressed and anxious mood as well as a concussion. Joyce s neuropsychological profile is characterized by strong verbal and non-verbal problem solving skills, select executive functioning deficits (working memory, cognitive flexibility, organization), and slightly below average fine motor skills. Joyce s moderate deficits in working memory could reflect heightened anxiety, or they could be longstanding aspects of her neurocognitive profile. Many children with a weakness in working memory have difficulties with reading comprehension and with understanding material when it is presented in a lecture format, tasks which have been difficult for Joyce since early childhood. At present, Joyce displays slightly below age-level reading comprehension, which may at times impact her performance when learning college-level material.     While Joyce sustained a concussion two years ago,  she demonstrated good memory, attention, and processing speed on current testing. These are areas that can be acutely impacted by concussion, but are expected to resolve over time. We are pleased to see that Joyce does not have any lingering deficits in these areas. Overall, Joyce s minimal neurocognitive deficits and physical symptoms indicate that she does not meet criteria for post-concussive syndrome. Instead, her current presentation suggests an interaction between her emotional functioning (anxiety) and her underlying weaknesses in working memory and executive functioning skills, which were likely exacerbated by her concussion for a period of several months. It will be important to help Joyce develop more adaptive coping skills to minimize the impact of her anxiety on her functioning. Joyce has a strong foundation and close network of social support indicating that she is likely to succeed with the appropriate supports in place.     Diagnoses:  F41.9 Anxiety Disorder, Unspecified  Z87.820 History of concussion    Based on Joyce s history and test results, the following recommendations are offered:    Clinical Care  1. Given Joyce s anxiety, she would benefit from continued therapy to learn more adaptive coping skills to manage her distress. Therapy should be skills-based, such as cognitive behavioral therapy (CBT). It will be helpful for her to learn relaxation strategies as well as different ways to challenge her negative thinking patterns. She would benefit from attending sessions regularly while she is in college. If there is a counseling center on campus, Joyce could see if there are any available providers or recommended providers in the area.     School Recommendations  1. 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 The following  accommodations are recommended:  a. Joyce would benefit from extended time on tests and quizzes so that she is able to better demonstrate her knowledge.  b. She would benefit from completing tests and quizzes in an alternative test setting that is quiet and distraction-free.   c. When possible, Joyce should receive an outline of class lectures prior to the class period. This will allow her to follow along and take notes as needed, without the additional stressor of comprehensive note-taking for the entire lecture. Should no documentation be available, she would benefit from having access to a copy of a classmate s notes or notetaking services.  d. Given that Joyce learns best when listening and seeing information, she may benefit from audiobooks so that she can follow along as information is read aloud.   e. [employer] is an online resource with a huge library of audiobooks and resources for people with reading difficulties: https://www.learningally.org/  f. She may also benefit from meeting with an advisor to work on organizing and prioritizing homework assignments and projects.   g. She may also benefit from additional help with study skills. Some colleges offer study skills workshops, writing support, and other programs for students who would like to learn tools to learn and study more effectively.    Home Recommendations  1. Adequate sleep is important to maximize learning and attention. The National Sleep Foundation recommends that young adults get between 7 and 9 hours of sleep each night. The following tips regarding good sleep hygiene are recommended:     a. Establish a consistent sleep schedule. Joyce should try to go to bed and wake up at the same time 7 days a week. In order to establish a good sleep rhythm, it is very important to keep the same schedule on a daily basis.     b. Establish a regular relaxing bedtime routine. Joyce should develop a nightly, calming ritual to use every night, before  going to sleep (e.g. taking a warm shower, reading a book, or listening to calming music). Over time, this will help her body recognize that it is bedtime.   c. Make sure that the sleeping environment is comfortable. Joyce s bedroom should be cool with enough blankets to keep him warm, dark, and quiet.  Also, the addition of a white noise such as an oscillating fan or sound machine may help Joyce fall asleep sooner and sleep more soundly. There are many apps available for this as well.  d. Eliminate naps.  In order to establish a good sleep schedule, we recommend that Joyce avoid taking naps during the day.    e. Avoid caffeine and nicotine close to bedtime. These substances serve to stimulate a person and can interfere with getting to sleep on time.   f. Exercise to promote good quality sleep. Regular outdoor exercise also can improve sleep quality. However, strenuous exercise should be avoided near the end of the day to help Joyce wind down and become ready for sleep.   g. Avoid foods that can be disruptive to sleep. Joyce should avoid heavy, rich, fatty, spicy meals and carbonated drinks close to bedtime as the trigger indigestion and can disrupt sleep. Trying to fall asleep on an empty stomach is not recommended. A light snack before bed may be helpful.   h. Screen time should be limited an hour before bed. Bright lights from cell phones and TV screens can disrupt sleep.   i. Reserve bed for sleeping only. Joyce should use her bed for sleeping only, and thus, she should watch TV or utilize other technology (such as a laptop computer) elsewhere than her bedroom. Joyce should initially go to bed only when tired.  At first, it may take a few days before she becomes tired at an earlier time, but it is very important that she not go to bed if it is likely that she will not sleep.   2. Heavy caffeine consumption as well as use of drugs and alcohol for recreational purposes will affect sleep and attention, and  can also impact academic and emotional functioning. Thus, it will be important that use of these substances is moderated.  3. Joyce would benefit from the following recommendations when completing school work:  a. When completing homework, Joyce should take frequent breaks. It may help to set a timer as a reminder to take short breaks or setting a goal with a small motivator to work towards. She will likely benefit from interspersing more difficulty assignments with easier, more enjoyable assignments. She should study in locations with limited distractions.   b. Study Guides and Strategies is an excellent online resource for students that includes a wealth of tips and tools for time management, completing writing assignments, and preparing for tests: www.studygs.net/  c. For additional help with study techniques, particularly with respect to preventing procrastination: www.studytechniques.org/  a. Joyce should use the  SQ3R  method to help remember and understand what she reads.   i.            Survey - get prepared, focus attention, read chapter headings  ii. Question - become an active reader; focus on what is important  iii. Read  iv. Recite - think about what you are reading, organize the material in your  notes, teach it to someone else  v. Review  4. The following resources are recommended to assist with learning strategies and support Joyce's working memory:   a. The following handout from the [employer] provides information about working memory and strategies to help individuals with working memory deficits learn - http://canlearnsociety.ca/wp-content/uploads/2013/03/LC_Working-Memory_N2.pdf   b. The [employer] website has a blog, podcasts, and materials to help individuals with working memory deficits and other learning difficulties succeed in school - http://www.learningscientists.org/blog?category=For+Students  5. Joyce may benefit from the following resource on anxiety:  a.  The  Anxiety and Phobia Workbook by Mark Cade, Ph.D. is a comprehensive guide with strategies to help individuals struggling with anxiety.       It has been a pleasure working with Joyce and her family. If you have any questions or concerns regarding this evaluation, please call the Pediatric Neuropsychology Clinic at [phone].      Karla Kovacs M.A.  Pediatric Neuropsychology Intern  Pediatric Neuropsychology  [facility]    Lilly Nagel (Rene), Ph.D., L.P.   of Pediatrics  Pediatric Neuropsychology  [facility]        PEDIATRIC NEUROPSYCHOLOGY CLINIC TEST SCORES    Note: The test data listed below use one or more of the following formats:      Standard Scores have an average of 100 and a standard deviation of 15 (the average range is 85 to 115).    Scaled Scores have an average of 10 and a standard deviation of 3 (the average range is 7 to 13).    T-Scores have an average of 50 and a standard deviation of 10 (the average range is 40 to 60).    Z-Scores have an average of 0 and a standard deviation of 1 (the average range is -1 to +1).    COGNITIVE FUNCTIONING    Wechsler Adult Intelligence Scale, Fourth Edition   Standard scores from 85 - 115 represent the average range of functioning.  Scaled scores from 7 - 13 represent the average range of functioning.    Index Standard Score   Verbal Comprehension 108   Perceptual Reasoning 100   Working Memory 77   Processing Speed 97   Full Scale IQ 97   General Ability Index 104     Subtest Scaled Score   Similarities 13   Vocabulary 11   Information 11   Block Design 12   Matrix Reasoning 8   Visual Puzzles 10   Digit Span    6   Arithmetic   6   Symbol Search 9   Coding 10     ACADEMIC ACHIEVEMENT    Sidney-Victor Manuel Reading Test (Form G)  Percentile scores 16-84 represent the average range.    Subtest Grade Equivalent Percentile   Vocabulary 14.2 47   Comprehension 9.5 15   Reading Rate - 39     ATTENTION AND  EXECUTIVE FUNCTIONING    Test of Variables of Attention, Visual  Scores from 85 - 115 represent the average range of functioning.      Measure Quarter 1 Quarter 2 Quarter 3 Quarter 4 Total   Omissions 103 102 105 101 103   Commissions 77** 106 101 108 102   Response Time 131 123 119 116 121   Variability 103 106 104 106 109   **Significantly outside of normal limits.      Avelina-Lezama Executive Function System Verbal Fluency Test  Scaled Scores from 7 - 13 represent the average range of functioning.    Measure Scaled Score   Letter Fluency 11   Category Fluency 9   Category Switching Total Correct 3   Category Switching Total Switching Accuracy 5     Error Scaled Score   Percent Set-Loss Error 13   Percent Repetition Error 10   Category Switching  Percent Switching Accuracy 9     Avelina-Lezama Executive Function System Trail Making Test  Scaled Scores from 7 - 13 represent the average range of functioning.  Cumulative %ile rank indicates that Joyce scored the same or better than X% of her peers.    Measure Scaled Score   Visual Scanning 12   Number Sequencing 13   Letter Sequencing 2*   Number-Letter Switching 10   Motor Speed 12    Cumulative %ile Rank Scaled Score   Omission Errors: Visual Scanning 100 -   Commission Errors: Visual Scanning 100 -   Sequencing Errors: Number Sequencing 3 -   Sequencing Errors: Letter Sequencing 100 -   Sequencing Errors: Letter-Number Sequencing 100 -   Set-Loss Errors: Number Sequencing 100 -   Set-Loss Errors: Letter Sequencing 100 -   Set-Loss Errors: Number-Letter Sequencing 100 -   All Error Types - 12   *Joyce could not find one of the letters and got flustered.     Behavior Rating Inventory of Executive Function - Adult Version  T-scores 65 and higher are considered to be in the  clinically significant  range.    Index/Scale Parent  T-Score Self-Report  T-Score   Inhibit 57 50   Shift 46 39   Emotional Control 48 47   Self-Monitor 54 42   Behavioral Regulation Index 51 44    Initiate 45 40   Working Memory 64 46   Plan/Organize 54 52   Task Monitor 65* 40   Organization of Materials 70* 47   Metacognition Index 60 45   Global Executive Composite 56 44   *Clinically significant    memory    [place] Verbal Learning Test, Second Edition   T-scores from 40 - 60 represent the average range of functioning.  Z-scores from -1.0 to 1.0 represent the average range of functioning. Higher scores are better unless indicated (*)    Measure Raw Score T-score   List A Total Trials 1-5 47 42        Measure  Z-score   List A Trial 1 Free Recall 4 -2.0   List A Trial 5 Free Recall 12 -1.0   List B Free Recall 4 -1.5   List A Short-Delay Free Recall 9 -1.0   List A Short-Delay Cued Recall 11 -0.5   List A Long-Delay Free Recall 10 -1.0   List A Long-Delay Cued Recall 11 -1.0   Correct Recognition Hits 15 -0.5   False Positives* 8 3.0   Discriminability 2.2 -2.0   *A lower score is better    Dre Complex Figure Test and Recognition Trial   T-scores from 40 - 60 define the average range of functioning.    Task Raw T-Score   Immediate Recall 29 59   Delayed Recall 28 56   Recognition 22 51     fine motor and visual-motor functioning    Purdue Pegboard  Standard scores from 85 - 115 represent the average range of functioning.    Trial Pegs Placed Standard Score   Dominant (RH) 14 81   Non-Dominant  14 80   Both Hands 12 pairs 81     Sylvestery-Bugino Developmental Test of Visual Motor Integration, Sixth Edition  Standard scores from 85 - 115 represent the average range of functioning.    Raw Score Standard Score   25 83     Dre Complex Figure Test and Recognition Trial   Percentile Scores >16 define the average range of functioning.    Task Raw Percentile   Copy 35 >16   Time to Copy 232 secs >16     emotional and behavioral functioning  For the Clinical Scales on the BASC-3, scores ranging from 60-69 are considered to be in the  at-risk  range and scores of 70 or higher are considered  clinically  significant.   For the Adaptive Scales, scores between 30 and 39 are considered to be in the  at-risk  range and scores of 29 or lower are considered  clinically significant.      Behavior Assessment System for Children, Third Edition, Parent Response Form    Clinical Scales T-Score  Adaptive Scales T-Score   Hyperactivity 50    Adaptability 59   Aggression 45  Social Skills 56   Conduct Problems  51  Leadership 53   Anxiety 48  Activities of Daily Living 43   Depression 43  Functional Communication 52   Somatization 50      Atypicality 41  Composite Indices    Withdrawal 39  Externalizing Problems 49   Attention Problems 54  Internalizing Problems 47      Behavioral Symptoms Index 45      Adaptive Skills 53     Behavioral Assessment System for Children, Second Edition, Self-Report Form    Clinical Scales T-Score  Adaptive Scales T-Score   Atypicality 53  Relations with Parents 48   Locus of Control 53  Interpersonal Relations 50   Social Stress 45  Self-Esteem 51   Anxiety 48  Self-Reliance 51   Depression 44      Sense of Inadequacy 42  Composite Indices    Somatization 53  Internalizing Problems 48   Attention Problems 51  Inattention/Hyperactivity 56   Hyperactivity 59  Emotional Symptoms Index 45   Sensation Seeking 51  Personal Adjustment 50   Alcohol Abuse 43      School Maladjustment 45        Time Spent: 5 hours professional time, including interview, record review, data integration, and report writing (29444); 6 hours trainee testing and report writing under supervision of a neuropsychologist ([id number]).    CC  SELF, REFERRED    Copy to patient's parents  To the parent of: Joyce Menendez  [address] [place] [address]    Copy to patient  Joyce Menendez  [address]  [place] [address]          Lilly Nagel, PhD     Discharged

## 2021-01-28 ENCOUNTER — IMMUNIZATION (OUTPATIENT)
Dept: NURSING | Facility: CLINIC | Age: 21
End: 2021-01-28
Payer: COMMERCIAL

## 2021-01-28 PROCEDURE — 0001A PR COVID VAC PFIZER DIL RECON 30 MCG/0.3 ML IM: CPT

## 2021-01-28 PROCEDURE — 91300 PR COVID VAC PFIZER DIL RECON 30 MCG/0.3 ML IM: CPT

## 2021-02-13 ENCOUNTER — HEALTH MAINTENANCE LETTER (OUTPATIENT)
Age: 21
End: 2021-02-13

## 2021-02-18 ENCOUNTER — IMMUNIZATION (OUTPATIENT)
Dept: NURSING | Facility: CLINIC | Age: 21
End: 2021-02-18
Attending: INTERNAL MEDICINE
Payer: COMMERCIAL

## 2021-02-18 PROCEDURE — 0002A PR COVID VAC PFIZER DIL RECON 30 MCG/0.3 ML IM: CPT

## 2021-02-18 PROCEDURE — 91300 PR COVID VAC PFIZER DIL RECON 30 MCG/0.3 ML IM: CPT

## 2021-09-18 ENCOUNTER — HEALTH MAINTENANCE LETTER (OUTPATIENT)
Age: 21
End: 2021-09-18

## 2022-03-05 ENCOUNTER — HEALTH MAINTENANCE LETTER (OUTPATIENT)
Age: 22
End: 2022-03-05

## 2022-11-20 ENCOUNTER — HEALTH MAINTENANCE LETTER (OUTPATIENT)
Age: 22
End: 2022-11-20

## 2023-04-15 ENCOUNTER — HEALTH MAINTENANCE LETTER (OUTPATIENT)
Age: 23
End: 2023-04-15

## 2024-10-09 ENCOUNTER — ANCILLARY PROCEDURE (OUTPATIENT)
Dept: GENERAL RADIOLOGY | Facility: CLINIC | Age: 24
End: 2024-10-09
Attending: NURSE PRACTITIONER
Payer: COMMERCIAL

## 2024-10-09 ENCOUNTER — OFFICE VISIT (OUTPATIENT)
Dept: URGENT CARE | Facility: URGENT CARE | Age: 24
End: 2024-10-09
Payer: COMMERCIAL

## 2024-10-09 VITALS
RESPIRATION RATE: 18 BRPM | HEIGHT: 61 IN | DIASTOLIC BLOOD PRESSURE: 70 MMHG | BODY MASS INDEX: 20.2 KG/M2 | OXYGEN SATURATION: 99 % | SYSTOLIC BLOOD PRESSURE: 100 MMHG | HEART RATE: 96 BPM | WEIGHT: 107 LBS | TEMPERATURE: 97.7 F

## 2024-10-09 DIAGNOSIS — J45.21 MILD INTERMITTENT REACTIVE AIRWAY DISEASE WITH ACUTE EXACERBATION: ICD-10-CM

## 2024-10-09 DIAGNOSIS — Z87.74 H/O VENTRICULAR SEPTAL DEFECT REPAIR: ICD-10-CM

## 2024-10-09 DIAGNOSIS — R05.1 ACUTE COUGH: ICD-10-CM

## 2024-10-09 DIAGNOSIS — R07.89 CHEST TIGHTNESS: ICD-10-CM

## 2024-10-09 DIAGNOSIS — R05.1 ACUTE COUGH: Primary | ICD-10-CM

## 2024-10-09 LAB
BASOPHILS # BLD AUTO: 0 10E3/UL (ref 0–0.2)
BASOPHILS NFR BLD AUTO: 1 %
D DIMER PPP FEU-MCNC: <0.27 UG/ML FEU (ref 0–0.5)
EOSINOPHIL # BLD AUTO: 0.2 10E3/UL (ref 0–0.7)
EOSINOPHIL NFR BLD AUTO: 3 %
ERYTHROCYTE [DISTWIDTH] IN BLOOD BY AUTOMATED COUNT: 12.7 % (ref 10–15)
HCT VFR BLD AUTO: 40.3 % (ref 35–47)
HGB BLD-MCNC: 13.1 G/DL (ref 11.7–15.7)
IMM GRANULOCYTES # BLD: 0 10E3/UL
IMM GRANULOCYTES NFR BLD: 0 %
LYMPHOCYTES # BLD AUTO: 1.3 10E3/UL (ref 0.8–5.3)
LYMPHOCYTES NFR BLD AUTO: 29 %
MCH RBC QN AUTO: 28.3 PG (ref 26.5–33)
MCHC RBC AUTO-ENTMCNC: 32.5 G/DL (ref 31.5–36.5)
MCV RBC AUTO: 87 FL (ref 78–100)
MONOCYTES # BLD AUTO: 0.4 10E3/UL (ref 0–1.3)
MONOCYTES NFR BLD AUTO: 9 %
NEUTROPHILS # BLD AUTO: 2.7 10E3/UL (ref 1.6–8.3)
NEUTROPHILS NFR BLD AUTO: 58 %
PLATELET # BLD AUTO: 308 10E3/UL (ref 150–450)
RBC # BLD AUTO: 4.63 10E6/UL (ref 3.8–5.2)
TROPONIN T SERPL HS-MCNC: <6 NG/L
WBC # BLD AUTO: 4.6 10E3/UL (ref 4–11)

## 2024-10-09 PROCEDURE — 99205 OFFICE O/P NEW HI 60 MIN: CPT | Performed by: NURSE PRACTITIONER

## 2024-10-09 PROCEDURE — 36415 COLL VENOUS BLD VENIPUNCTURE: CPT | Performed by: NURSE PRACTITIONER

## 2024-10-09 PROCEDURE — 85025 COMPLETE CBC W/AUTO DIFF WBC: CPT | Performed by: NURSE PRACTITIONER

## 2024-10-09 PROCEDURE — 84484 ASSAY OF TROPONIN QUANT: CPT | Performed by: NURSE PRACTITIONER

## 2024-10-09 PROCEDURE — 71046 X-RAY EXAM CHEST 2 VIEWS: CPT | Mod: TC | Performed by: RADIOLOGY

## 2024-10-09 PROCEDURE — 85379 FIBRIN DEGRADATION QUANT: CPT | Performed by: NURSE PRACTITIONER

## 2024-10-09 PROCEDURE — 93000 ELECTROCARDIOGRAM COMPLETE: CPT | Performed by: NURSE PRACTITIONER

## 2024-10-09 RX ORDER — ALBUTEROL SULFATE 90 UG/1
1-2 INHALANT RESPIRATORY (INHALATION) EVERY 6 HOURS PRN
Qty: 18 G | Refills: 0 | Status: SHIPPED | OUTPATIENT
Start: 2024-10-09 | End: 2024-10-19

## 2024-10-09 RX ORDER — BENZONATATE 200 MG/1
200 CAPSULE ORAL 3 TIMES DAILY PRN
Qty: 21 CAPSULE | Refills: 0 | Status: SHIPPED | OUTPATIENT
Start: 2024-10-09 | End: 2024-10-16

## 2024-10-09 NOTE — PATIENT INSTRUCTIONS
Would favor reactive airway viral URI with cough seasonal allergies lingering  Chest x-ray is normal, fullness can be a benign normal variant in young healthy adults  IMPRESSION: No focal airspace opacities, pleural effusion or  pneumothorax. Normal heart size. Mild fullness in the aortopulmonic  window, likely corresponds to the main pulmonary artery.  EKG sinus rhythm with slight discrepancies of notched R waves and T wave inversion, no comparison, some of these changes can be seen due to scar tissue post heart surgery  Normal P waves and no ST elevation tombstoning indicative of a heart attack  Troponin and D-dimer pending, will call with results in the next 1 to 2 hours  CBC without leukocytosis bacterial shift or anemia  Primary care and cardiology urgent follow-up as discussed  Pt LWBS at ER yesterday, much improved symptoms now  ER if any worsening symptoms of chest pain shortness of breath palpitations dizziness sweats weakness bloody sputum  Urgent care limited without echo stat labs serial EKGs cards consult obs

## 2024-10-09 NOTE — PROGRESS NOTES
Chief Complaint   Patient presents with    Urgent Care     Sick since end of August. Persistent cough, Covid neg x 2. Trouble breathing, short of breath.      SUBJECTIVE:  Joyce Menendez is a 24 year old female presenting with worsening sob cough chest tightness heaviness dizziness in the last 2 weeks.  Started as a cough cold with nausea lethargy headache dry tickly upper airway allergy symptoms 2.5 months ago.  She attributes this to poor sleep as she has a new boyfriend.  Last night symptoms became concerning following her dead lifting workout.  She also was hiking yesterday at the [place]. She got in her car last night and felt short of breath tight with a heavy chest and dizziness.  Went to the ER and left without being seen due to a 5-hour wait.  Symptoms much improved overnight, so does not feel ER is warranted.  History of heart surgery VSD repair as a baby.  Declines pleurisy hemoptysis calf pain bulging varicose vein pitting edema redness warmth palpitations history of DVT PE.  She does not have a primary care nor a cardiologist. Has been drinking energy drinks.    No past medical history on file.  Current Outpatient Medications   Medication Sig Dispense Refill    albuterol (PROAIR HFA/PROVENTIL HFA/VENTOLIN HFA) 108 (90 Base) MCG/ACT inhaler Inhale 1-2 puffs into the lungs every 6 hours as needed for shortness of breath, wheezing or cough. 18 g 0    benzonatate (TESSALON) 200 MG capsule Take 1 capsule (200 mg) by mouth 3 times daily as needed for cough. 21 capsule 0    acetaminophen (TYLENOL) 325 MG tablet Take 325-650 mg by mouth every 4 hours as needed for mild pain      etonogestrel (IMPLANON/NEXPLANON) 68 MG IMPL 1 each by Subdermal route once (Patient not taking: Reported on 10/9/2024)       No current facility-administered medications for this visit.     Social History     Tobacco Use    Smoking status: Never    Smokeless tobacco: Never   Substance Use Topics    Alcohol use: No     Allergies  "  Allergen Reactions    Sulfa Antibiotics Hives     Anaphylaxis per pt    Macrobid [Nitrofurantoin]        Review of Systems  All systems negative except for those listed above in HPI.    OBJECTIVE:   /70   Pulse 96   Temp 97.7  F (36.5  C) (Temporal)   Resp 18   Ht 1.549 m (5' 1\")   Wt 48.5 kg (107 lb)   SpO2 99%   BMI 20.22 kg/m      Physical Exam  Constitutional:       General: She is not in acute distress.     Appearance: Normal appearance. She is well-developed. She is not ill-appearing, toxic-appearing or diaphoretic.   HENT:      Head: Normocephalic and atraumatic.      Right Ear: External ear normal.      Left Ear: External ear normal.      Nose: Nose normal.      Mouth/Throat:      Mouth: Mucous membranes are moist.      Pharynx: Oropharynx is clear.   Eyes:      Pupils: Pupils are equal, round, and reactive to light.   Cardiovascular:      Rate and Rhythm: Normal rate.      Pulses: Normal pulses.      Heart sounds: Normal heart sounds. No murmur heard.     No friction rub. No gallop.   Pulmonary:      Effort: No respiratory distress.      Breath sounds: No stridor. No wheezing, rhonchi or rales.   Chest:      Chest wall: No tenderness.   Musculoskeletal:         General: Normal range of motion.      Cervical back: Normal range of motion and neck supple.      Right lower leg: No edema.      Left lower leg: No edema.   Lymphadenopathy:      Cervical: No cervical adenopathy.   Skin:     General: Skin is warm and dry.      Capillary Refill: Capillary refill takes less than 2 seconds.      Coloration: Skin is not pale.      Findings: No rash.   Neurological:      General: No focal deficit present.      Mental Status: She is alert and oriented to person, place, and time.      Coordination: Coordination normal.   Psychiatric:         Mood and Affect: Mood normal.         Behavior: Behavior normal.       EKG done in clinic read by me as sinus rhythm with slight discrepancies of R wave notching and " some T wave inversion.  There is normal P waves and no ST elevation.  X-ray done in clinic read by me as negative for cardiopulmonary abnormality.  Results for orders placed or performed in visit on 10/09/24   XR Chest 2 Views     Status: None    Narrative    XR CHEST 2 VIEWS 10/9/2024 2:27 PM    HISTORY: worsening sob cough chest tightness heaviness dizziness,  history of heart surgery; Acute cough    COMPARISON: None.      Impression    IMPRESSION: No focal airspace opacities, pleural effusion or  pneumothorax. Normal heart size. Mild fullness in the aortopulmonic  window, likely corresponds to the main pulmonary artery.    ANASTASIYA LEY MD         SYSTEM ID:  [id number]   Results for orders placed or performed in visit on 10/09/24   CBC with platelets and differential     Status: None   Result Value Ref Range    WBC Count 4.6 4.0 - 11.0 10e3/uL    RBC Count 4.63 3.80 - 5.20 10e6/uL    Hemoglobin 13.1 11.7 - 15.7 g/dL    Hematocrit 40.3 35.0 - 47.0 %    MCV 87 78 - 100 fL    MCH 28.3 26.5 - 33.0 pg    MCHC 32.5 31.5 - 36.5 g/dL    RDW 12.7 10.0 - 15.0 %    Platelet Count 308 150 - 450 10e3/uL    % Neutrophils 58 %    % Lymphocytes 29 %    % Monocytes 9 %    % Eosinophils 3 %    % Basophils 1 %    % Immature Granulocytes 0 %    Absolute Neutrophils 2.7 1.6 - 8.3 10e3/uL    Absolute Lymphocytes 1.3 0.8 - 5.3 10e3/uL    Absolute Monocytes 0.4 0.0 - 1.3 10e3/uL    Absolute Eosinophils 0.2 0.0 - 0.7 10e3/uL    Absolute Basophils 0.0 0.0 - 0.2 10e3/uL    Absolute Immature Granulocytes 0.0 <=0.4 10e3/uL   D dimer, quantitative     Status: Normal   Result Value Ref Range    D-Dimer Quantitative <0.27 0.00 - 0.50 ug/mL FEU    Narrative    This D-dimer assay is intended for use in conjunction with a clinical pretest probability assessment model to exclude pulmonary embolism (PE) and deep venous thrombosis (DVT) in outpatients suspected of PE or DVT. The cut-off value is 0.50 ug/mL FEU.   Troponin T, High Sensitivity      Status: Normal   Result Value Ref Range    Troponin T, High Sensitivity <6 <=14 ng/L   CBC with platelets and differential     Status: None    Narrative    The following orders were created for panel order CBC with platelets and differential.  Procedure                               Abnormality         Status                     ---------                               -----------         ------                     CBC with platelets and d...[[id number]]                      Final result                 Please view results for these tests on the individual orders.     ASSESSMENT:    ICD-10-CM    1. Acute cough  R05.1 XR Chest 2 Views     CBC with platelets and differential     CBC with platelets and differential     benzonatate (TESSALON) 200 MG capsule      2. Chest tightness  R07.89 EKG 12-lead complete w/read - Clinics     D dimer, quantitative     Troponin T, High Sensitivity     D dimer, quantitative     Troponin T, High Sensitivity     EKG 12-lead complete w/read - Clinics     Adult Cardiology Eval  Referral      3. H/O ventricular septal defect repair  Z87.74 Adult Cardiology Eval  Referral      4. Mild intermittent reactive airway disease with acute exacerbation  J45.21 benzonatate (TESSALON) 200 MG capsule     albuterol (PROAIR HFA/PROVENTIL HFA/VENTOLIN HFA) 108 (90 Base) MCG/ACT inhaler        PLAN:    Chest x-ray is normal, fullness can be a benign normal variant in young healthy adults  EKG sinus rhythm with slight discrepancies of notched R waves and T wave inversion, no comparison, some of these changes can be seen due to scar tissue post heart surgery  Normal P waves and no ST elevation tombstoning indicative of a heart attack  Troponin and D-dimer normal  CBC without leukocytosis bacterial shift or anemia  Primary care and cardiology urgent follow-up as discussed  Pt LWBS at ER yesterday, much improved symptoms now  ER if any worsening symptoms of chest pain shortness of breath  palpitations dizziness sweats weakness bloody sputum  Urgent care limited without echo stat labs serial EKGs cards consult obs    Follow up with primary care provider with any problems, questions or concerns or if symptoms worsen or fail to improve. Patient agreed to plan and verbalized understanding.    Lorie Zhao, DOMING[facility] CARE [facility]

## 2024-11-09 ENCOUNTER — HEALTH MAINTENANCE LETTER (OUTPATIENT)
Age: 24
End: 2024-11-09

## 2025-06-20 ENCOUNTER — TELEPHONE (OUTPATIENT)
Dept: URGENT CARE | Facility: URGENT CARE | Age: 25
End: 2025-06-20

## 2025-06-20 NOTE — TELEPHONE ENCOUNTER
General Call    Contacts       Contact Date/Time Type Contact Phone/Fax    06/20/2025 04:13 PM CDT Phone (Incoming) Joyce Menendez (Self) [phone] (M)          Reason for Call:  UTI and related to period and has a question following up on that. Wants to speak with provider or care team.     What are your questions or concerns:  please contact patient, patient wants to talk.    Date of last appointment with provider: 6/20/25    Could we send this information to you in [employer] or would you prefer to receive a phone call?:   Patient would prefer a phone call   Okay to leave a detailed message?: Yes at Cell number on file:    Telephone Information:   Mobile [phone]

## 2025-06-21 NOTE — TELEPHONE ENCOUNTER
Called pt no answer LVM to call back regarding her concerns/questions.   Park Jerez MA on 6/21/2025 at 10:13 AM